# Patient Record
Sex: FEMALE | Race: BLACK OR AFRICAN AMERICAN | Employment: FULL TIME | URBAN - METROPOLITAN AREA
[De-identification: names, ages, dates, MRNs, and addresses within clinical notes are randomized per-mention and may not be internally consistent; named-entity substitution may affect disease eponyms.]

---

## 2017-03-24 RX ORDER — HYDROCHLOROTHIAZIDE 12.5 MG/1
CAPSULE ORAL
Qty: 30 CAP | Refills: 6 | Status: SHIPPED | OUTPATIENT
Start: 2017-03-24

## 2019-09-12 ENCOUNTER — OFFICE VISIT (OUTPATIENT)
Dept: ENDOCRINOLOGY | Age: 69
End: 2019-09-12

## 2019-09-12 VITALS
TEMPERATURE: 98.6 F | OXYGEN SATURATION: 97 % | HEART RATE: 97 BPM | DIASTOLIC BLOOD PRESSURE: 81 MMHG | WEIGHT: 143.8 LBS | RESPIRATION RATE: 18 BRPM | HEIGHT: 62 IN | SYSTOLIC BLOOD PRESSURE: 149 MMHG | BODY MASS INDEX: 26.46 KG/M2

## 2019-09-12 DIAGNOSIS — E83.52 HYPERCALCEMIA: ICD-10-CM

## 2019-09-12 DIAGNOSIS — E04.2 NONTOXIC MULTINODULAR GOITER: ICD-10-CM

## 2019-09-12 DIAGNOSIS — E67.3 HYPERVITAMINOSIS D: ICD-10-CM

## 2019-09-12 DIAGNOSIS — E83.52 HYPERCALCEMIA: Primary | ICD-10-CM

## 2019-09-12 DIAGNOSIS — M81.0 SENILE OSTEOPOROSIS: ICD-10-CM

## 2019-09-12 RX ORDER — METFORMIN HYDROCHLORIDE 1000 MG/1
TABLET ORAL
Refills: 5 | COMMUNITY
Start: 2019-07-29

## 2019-09-12 RX ORDER — ATENOLOL 50 MG/1
50 TABLET ORAL DAILY
Qty: 30 TAB | Refills: 6 | Status: SHIPPED | OUTPATIENT
Start: 2019-09-12 | End: 2020-03-19 | Stop reason: SDUPTHER

## 2019-09-12 RX ORDER — HYDROCHLOROTHIAZIDE 25 MG/1
TABLET ORAL
Refills: 1 | COMMUNITY
Start: 2019-06-07

## 2019-09-12 RX ORDER — IRBESARTAN 150 MG/1
150 TABLET ORAL 2 TIMES DAILY
Refills: 5 | COMMUNITY
Start: 2019-07-22

## 2019-09-12 RX ORDER — PIOGLITAZONEHYDROCHLORIDE 15 MG/1
TABLET ORAL
Refills: 5 | COMMUNITY
Start: 2019-07-17

## 2019-09-12 NOTE — PROGRESS NOTES
1. Have you been to the ER, urgent care clinic since your last visit? No Hospitalized since your last visit? No    2. Have you seen or consulted any other health care providers outside of the [address] [place] [address] since your last visit? Include any pap smears or colon screening.  No    Wt Readings from Last 3 Encounters:   09/12/19 143 lb 12.8 oz (65.2 kg)   10/19/15 132 lb (59.9 kg)   07/10/15 135 lb (61.2 kg)     Temp Readings from Last 3 Encounters:   09/12/19 98.6 °F (37 °C) (Oral)   10/19/15 97.1 °F (36.2 °C) (Oral)   07/10/15 98.1 °F (36.7 °C) (Oral)     BP Readings from Last 3 Encounters:   09/12/19 149/81   10/19/15 (!) 175/91   07/10/15 (!) 187/103     Pulse Readings from Last 3 Encounters:   09/12/19 97   10/19/15 92   07/10/15 91

## 2019-09-12 NOTE — PROGRESS NOTES
HISTORY OF PRESENT ILLNESS  Ekta Ken is a 68 y.o. female. HPI  Initial visit for hypercalcemia   Referred by pcp     Patient presents with hypercalcemia /hyperparathyroidism. Accompanied by her  Daughter     Pt was seen years ago for the same diagnosis   eval was complete     The patient has  asymptomatic elevation of the serum and calcium and parathormone. onset of symptoms was several weeks ago, stable since that time. She has  been taking medication : thiazide diuretic     She has not had previous history of head or neck radiation. She does not have familial history of endocrine malignancies. She has NOT BEEN taking OTC calcium supplements   History reviewed. No pertinent past medical history.     Social History     Socioeconomic History    Marital status: SINGLE     Spouse name: Not on file    Number of children: Not on file    Years of education: Not on file    Highest education level: Not on file   Occupational History    Not on file   Social Needs    Financial resource strain: Not on file    Food insecurity:     Worry: Not on file     Inability: Not on file    Transportation needs:     Medical: Not on file     Non-medical: Not on file   Tobacco Use    Smoking status: Former Smoker     Last attempt to quit: 2/3/2012     Years since quittin.6    Smokeless tobacco: Never Used   Substance and Sexual Activity    Alcohol use: No    Drug use: No    Sexual activity: Not on file   Lifestyle    Physical activity:     Days per week: Not on file     Minutes per session: Not on file    Stress: Not on file   Relationships    Social connections:     Talks on phone: Not on file     Gets together: Not on file     Attends Oriental orthodox service: Not on file     Active member of club or organization: Not on file     Attends meetings of clubs or organizations: Not on file     Relationship status: Not on file    Intimate partner violence:     Fear of current or ex partner: Not on file     Emotionally abused: Not on file     Physically abused: Not on file     Forced sexual activity: Not on file   Other Topics Concern    Not on file   Social History Narrative    Not on file       Family History   Problem Relation Age of Onset    Diabetes Sister     Cancer Brother     Diabetes Brother        Review of Systems   Constitutional: Negative. HENT: Negative. Eyes: Negative. Respiratory: Negative. Cardiovascular: Negative. Gastrointestinal: Negative. Genitourinary: Negative. Musculoskeletal: Negative. Skin: Negative. Neurological: Negative. Endo/Heme/Allergies: Negative. Psychiatric/Behavioral: Negative. Physical Exam   Constitutional: She is oriented to person, place, and time. She appears well-developed and well-nourished. HENT:   Head: Normocephalic. Eyes: Pupils are equal, round, and reactive to light. Conjunctivae and EOM are normal.   Neck: Normal range of motion. Neck supple. Cardiovascular: Normal rate and regular rhythm. Pulmonary/Chest: Effort normal and breath sounds normal.   Abdominal: Soft. Bowel sounds are normal.   Musculoskeletal: Normal range of motion. Neurological: She is alert and oriented to person, place, and time. She has normal reflexes. Skin: Skin is warm and dry. Psychiatric: She has a normal mood and affect. ASSESSMENT and PLAN    1.  Hypercalcemia :    Patient has mild range of hypercalcemia   Differential includes - medication induced- HCTZ   Versus primary hyperparathyroidism     Will stop HCTZ and as stopping hctz is going to affect BP, will add a BP medication norvasc     Will order repeat labs   Will order 24 hr urine for calcium, sodium , creatinine  and bone DEXA     OLD PARATHYROID SCAN  IN 2015  DID NOT LOCALIZE THE TUMOR AND  very low 24 hr urine calcium levels raising the differential diagnosis to include [address] [place] [address]     As  most patients do not require aggressive intervention unless calcium levels are high  I optED for watchful observation IN HER CASE   In some patients WHERE  parathyroid adenoma GETS LOCALIZED, AND  if they satisfy surgical criterion we recommend surgical intervention    Patients can continue on taking low dose calcium supplements despite the hypecalcemia         PATIENT  AND HER DAUGHTER  HAS BEEN EXPLAINED ABOUT THE PAST EVALUATION       > 50 % of time is spent on counseling   Patient voiced understanding her plan of care

## 2019-09-12 NOTE — LETTER
9/15/19 Patient: Iza Sahu YOB: 1950 Date of Visit: 9/12/2019 Joelle Walsh MD 
[address] [address] VIA Facsimile: [phone] Dear Joelle Walsh MD, Thank you for referring Ms. Iza Sahu to [address] [place] [address] for evaluation. My notes for this consultation are attached. If you have questions, please do not hesitate to call me. I look forward to following your patient along with you. Sincerely, Opal Howard MD

## 2019-09-12 NOTE — PATIENT INSTRUCTIONS
STOP  HCTZ FOR A MONTH  Start on  Atenolol 50 mg a day   IN ABSENCE OF  HCTZ        -------------------------------------------------------------------------------------------------------------------------------------    24 hour urine collection instructions , for calcium, creat and sodium   After  3 weeks of stopping  HCTZ    On the day you plan to collect urine    1. Discard first urine sample soon after you get up    2. Start collecting urine samples in the container then on whole first day . Crissa January ... 3. until the first sample next day is collected into the jar.

## 2019-10-02 DIAGNOSIS — E83.52 HYPERCALCEMIA: Primary | ICD-10-CM

## 2019-10-07 ENCOUNTER — HOSPITAL ENCOUNTER (OUTPATIENT)
Dept: LAB | Age: 69
Discharge: HOME | End: 2019-10-07
Payer: MEDICARE

## 2019-10-07 DIAGNOSIS — E83.52 HYPERCALCEMIA: ICD-10-CM

## 2019-10-07 PROCEDURE — 82570 ASSAY OF URINE CREATININE: CPT

## 2019-10-07 PROCEDURE — 84300 ASSAY OF URINE SODIUM: CPT

## 2019-10-07 PROCEDURE — 82340 ASSAY OF CALCIUM IN URINE: CPT

## 2019-10-08 LAB
CALCIUM 24H UR-MCNC: 1.7 MG/DL
CALCIUM 24H UR-MRATE: 22.1 MG/24 HR (ref 100–300)
CREAT 24H UR-MRATE: 894 MG/24 HR (ref 800–1800)
CREAT UR-MCNC: 68.8 MG/DL
SODIUM 24H UR-SCNC: 96 MMOL/L
SODIUM 24H UR-SRATE: 125 MMOL/24 HR (ref 39–258)

## 2019-10-15 ENCOUNTER — HOSPITAL ENCOUNTER (OUTPATIENT)
Dept: MAMMOGRAPHY | Age: 69
Discharge: HOME | End: 2019-10-15
Attending: INTERNAL MEDICINE
Payer: MEDICARE

## 2019-10-15 DIAGNOSIS — M81.0 SENILE OSTEOPOROSIS: ICD-10-CM

## 2019-10-15 DIAGNOSIS — E83.52 HYPERCALCEMIA: ICD-10-CM

## 2019-10-15 PROCEDURE — 77080 DXA BONE DENSITY AXIAL: CPT

## 2019-10-16 NOTE — PROGRESS NOTES
Inform pt and daughter that the urine calcium is very low going in favor of genetic problem   So, no further testing needed

## 2019-11-15 ENCOUNTER — TELEPHONE (OUTPATIENT)
Dept: ENDOCRINOLOGY | Age: 69
End: 2019-11-15

## 2019-11-15 NOTE — TELEPHONE ENCOUNTER
----- Message from Irena Kaiser MD sent at 11/15/2019  3:05 PM EST -----  Regarding: RE: appointment  These both tests are done better at [facility]     She had them once , and it was negative many years ago     If this time at [facility] is negative, I might plan to send her to spect at [facility] too         ----- Message -----  From: Christy Crandall: 11/11/2019   2:31 PM EST  To: Irena Kaiser MD  Subject: appointment                                      Patient called wanting to know if she still needs testing US and parathyroid scan. If so can she go somewhere around here?

## 2019-11-15 NOTE — TELEPHONE ENCOUNTER
Spoke with patient and she will schedule appt. Spoke with Scheduling Dept and they will call patient.

## 2019-12-02 ENCOUNTER — TELEPHONE (OUTPATIENT)
Dept: ENDOCRINOLOGY | Age: 69
End: 2019-12-02

## 2019-12-02 NOTE — TELEPHONE ENCOUNTER
Spoke to patient. Patient was inquiring if she needs to have ultrasound and parathyroid scan done on December 4, 2019. I informed patient that she needs to have these test done. Patient verbalized understanding.

## 2019-12-02 NOTE — TELEPHONE ENCOUNTER
----- Message from Divya Brandon sent at 11/29/2019  2:18 PM EST -----  Regarding: Dr. Marcelina Landin Message/Vendor Calls    Caller's first and last name:  Pt    Reason for call:  Discuss upcoming procedure appt scheduled at [facility]. Callback required yes/no and why:  YES    Best contact number(s):  [phone]    Details to clarify the request:  Pt requesting to speak with provider in regards to test scheduled at [facility].  Inquiring what test well be done at [facility]

## 2019-12-04 ENCOUNTER — HOSPITAL ENCOUNTER (OUTPATIENT)
Dept: NUCLEAR MEDICINE | Age: 69
Discharge: HOME | End: 2019-12-04
Attending: INTERNAL MEDICINE
Payer: MEDICARE

## 2019-12-04 ENCOUNTER — HOSPITAL ENCOUNTER (OUTPATIENT)
Dept: ULTRASOUND IMAGING | Age: 69
Discharge: HOME | End: 2019-12-04
Attending: INTERNAL MEDICINE
Payer: MEDICARE

## 2019-12-04 DIAGNOSIS — E83.52 HYPERCALCEMIA: ICD-10-CM

## 2019-12-04 PROCEDURE — 78070 PARATHYROID PLANAR IMAGING: CPT

## 2019-12-04 PROCEDURE — 76536 US EXAM OF HEAD AND NECK: CPT

## 2019-12-04 NOTE — PROGRESS NOTES
Inform the daughter that there is no parathyroid adenoma visualized.   There is a thyroid nodule on the right lobe which is gotten bigger in size  Patient will be following up with me in the month of January and at that time the plan will be discussed with the patient and her daughter    Jory Alarcon MD

## 2020-01-16 ENCOUNTER — LAB ONLY (OUTPATIENT)
Dept: ENDOCRINOLOGY | Age: 70
End: 2020-01-16

## 2020-01-16 ENCOUNTER — HOSPITAL ENCOUNTER (OUTPATIENT)
Dept: LAB | Age: 70
Discharge: HOME | End: 2020-01-16

## 2020-01-16 DIAGNOSIS — E83.52 HYPERCALCEMIA: Primary | ICD-10-CM

## 2020-01-16 DIAGNOSIS — E83.52 HYPERCALCEMIA: ICD-10-CM

## 2020-01-16 LAB
25(OH)D3 SERPL-MCNC: 40.8 NG/ML (ref 30–100)
ALBUMIN SERPL-MCNC: 4.2 G/DL (ref 3.5–5)
ALBUMIN/GLOB SERPL: 0.9 {RATIO} (ref 1.1–2.2)
ALP SERPL-CCNC: 54 U/L (ref 45–117)
ALT SERPL-CCNC: 16 U/L (ref 12–78)
ANION GAP SERPL CALC-SCNC: 9 MMOL/L (ref 5–15)
AST SERPL-CCNC: 14 U/L (ref 15–37)
BILIRUB SERPL-MCNC: 0.2 MG/DL (ref 0.2–1)
BUN SERPL-MCNC: 33 MG/DL (ref 6–20)
BUN/CREAT SERPL: 26 (ref 12–20)
CALCIUM SERPL-MCNC: 10.9 MG/DL (ref 8.5–10.1)
CHLORIDE SERPL-SCNC: 105 MMOL/L (ref 97–108)
CO2 SERPL-SCNC: 24 MMOL/L (ref 21–32)
CREAT SERPL-MCNC: 1.25 MG/DL (ref 0.55–1.02)
GLOBULIN SER CALC-MCNC: 4.9 G/DL (ref 2–4)
GLUCOSE SERPL-MCNC: 139 MG/DL (ref 65–100)
POTASSIUM SERPL-SCNC: 4.8 MMOL/L (ref 3.5–5.1)
PROT SERPL-MCNC: 9.1 G/DL (ref 6.4–8.2)
SODIUM SERPL-SCNC: 138 MMOL/L (ref 136–145)

## 2020-01-17 LAB
CALCIUM SERPL-MCNC: 10.8 MG/DL (ref 8.5–10.1)
PTH-INTACT SERPL-MCNC: 86.1 PG/ML (ref 18.4–88)

## 2020-01-23 ENCOUNTER — OFFICE VISIT (OUTPATIENT)
Dept: ENDOCRINOLOGY | Age: 70
End: 2020-01-23

## 2020-01-23 VITALS
OXYGEN SATURATION: 99 % | HEART RATE: 98 BPM | DIASTOLIC BLOOD PRESSURE: 88 MMHG | RESPIRATION RATE: 18 BRPM | HEIGHT: 62 IN | TEMPERATURE: 97.2 F | SYSTOLIC BLOOD PRESSURE: 162 MMHG | WEIGHT: 139 LBS | BODY MASS INDEX: 25.58 KG/M2

## 2020-01-23 DIAGNOSIS — E83.52 HYPERCALCEMIA: Primary | ICD-10-CM

## 2020-01-23 DIAGNOSIS — E04.1 THYROID NODULE: ICD-10-CM

## 2020-01-23 DIAGNOSIS — E88.09 HYPERPROTEINEMIA: ICD-10-CM

## 2020-01-23 NOTE — PROGRESS NOTES
Room 2    Identified pt with two pt identifiers(name and ). Reviewed record in preparation for visit and have obtained necessary documentation. All patient medications has been reviewed. Chief Complaint   Patient presents with    Elevated Blood Calcium     no concerns.  Thyroid Problem       Health Maintenance Due   Topic    Hepatitis C Screening     HEMOGLOBIN A1C Q6M     LIPID PANEL Q1     FOOT EXAM Q1     MICROALBUMIN Q1     DTaP/Tdap/Td series (1 - Tdap)    Shingrix Vaccine Age 50> (1 of 2)    BREAST CANCER SCRN MAMMOGRAM     FOBT Q 1 YEAR AGE 50-75     GLAUCOMA SCREENING Q2Y     Pneumococcal 65+ years (1 of 1 - PPSV23)    EYE EXAM RETINAL OR DILATED     MEDICARE YEARLY EXAM     Influenza Age 5 to Adult        Vitals:    20 1143 20 1152   BP: 173/90 162/88   Pulse: 98    Resp: 18    Temp: 97.2 °F (36.2 °C)    TempSrc: Oral    SpO2: 99%    Weight: 139 lb (63 kg)    Height: 5' 2\" (1.575 m)    PainSc:   0 - No pain        Wt Readings from Last 3 Encounters:   20 139 lb (63 kg)   19 143 lb 12.8 oz (65.2 kg)   10/19/15 132 lb (59.9 kg)     Temp Readings from Last 3 Encounters:   20 97.2 °F (36.2 °C) (Oral)   19 98.6 °F (37 °C) (Oral)   10/19/15 97.1 °F (36.2 °C) (Oral)     BP Readings from Last 3 Encounters:   20 162/88   19 149/81   10/19/15 (!) 175/91     Pulse Readings from Last 3 Encounters:   20 98   19 97   10/19/15 92       No results found for: HBA1C, HGBE8, ZMC4JPAZ, ZYK1FZTS, WFU3PQID    Coordination of Care Questionnaire:   1) Have you been to an emergency room, urgent care, or hospitalized since your last visit?   no       2. Have seen or consulted any other health care provider since your last visit? NO    3) Do you have an Advanced Directive/ Living Will in place?  NO  If yes, do we have a copy on file NO  If no, would you like information NO    Patient is accompanied by daughter I have received verbal consent from Trey Noe Calista to discuss any/all medical information while they are present in the room.

## 2020-01-23 NOTE — PROGRESS NOTES
HISTORY OF PRESENT ILLNESS  Nieves Krabbe is a 68 y.o. female. HPI  First follow up after  Initial visit for hypercalcemia   From  2019       Accompanied  By daughter   She had labs done as requested           Old history  :    Referred by pcp   Patient presents with hypercalcemia /hyperparathyroidism. Accompanied by her  Daughter   Pt was seen years ago for the same diagnosis   eval was complete     The patient has  asymptomatic elevation of the serum and calcium and parathormone. onset of symptoms was several weeks ago, stable since that time. She has  been taking medication : thiazide diuretic     She has not had previous history of head or neck radiation. She does not have familial history of endocrine malignancies. She has NOT BEEN taking OTC calcium supplements   History reviewed. No pertinent past medical history.     Social History     Socioeconomic History    Marital status: SINGLE     Spouse name: Not on file    Number of children: Not on file    Years of education: Not on file    Highest education level: Not on file   Occupational History    Not on file   Social Needs    Financial resource strain: Not on file    Food insecurity:     Worry: Not on file     Inability: Not on file    Transportation needs:     Medical: Not on file     Non-medical: Not on file   Tobacco Use    Smoking status: Former Smoker     Last attempt to quit: 2/3/2012     Years since quittin.9    Smokeless tobacco: Never Used   Substance and Sexual Activity    Alcohol use: No    Drug use: No    Sexual activity: Not on file   Lifestyle    Physical activity:     Days per week: Not on file     Minutes per session: Not on file    Stress: Not on file   Relationships    Social connections:     Talks on phone: Not on file     Gets together: Not on file     Attends Mormonism service: Not on file     Active member of club or organization: Not on file     Attends meetings of clubs or organizations: Not on file Relationship status: Not on file    Intimate partner violence:     Fear of current or ex partner: Not on file     Emotionally abused: Not on file     Physically abused: Not on file     Forced sexual activity: Not on file   Other Topics Concern    Not on file   Social History Narrative    Not on file       Family History   Problem Relation Age of Onset    Diabetes Sister     Cancer Brother     Diabetes Brother        Review of Systems   Constitutional: Negative. HENT: Negative. Eyes: Negative. Respiratory: Negative. Cardiovascular: Negative. Gastrointestinal: Negative. Genitourinary: Negative. Musculoskeletal: Negative. Skin: Negative. Neurological: Negative. Endo/Heme/Allergies: Negative. Psychiatric/Behavioral: Negative. Physical Exam   Constitutional: She is oriented to person, place, and time. She appears well-developed and well-nourished. HENT:   Head: Normocephalic. Eyes: Pupils are equal, round, and reactive to light. Conjunctivae and EOM are normal.   Neck: Normal range of motion. Neck supple. Cardiovascular: Normal rate and regular rhythm. Pulmonary/Chest: Effort normal and breath sounds normal.   Abdominal: Soft. Bowel sounds are normal.   Musculoskeletal: Normal range of motion. Neurological: She is alert and oriented to person, place, and time. She has normal reflexes. Skin: Skin is warm and dry. Psychiatric: She has a normal mood and affect. ASSESSMENT and PLAN    1.  Hypercalcemia :  Patient has mild range of hypercalcemia   Differential includes - medication induced- HCTZ   Versus primary hyperparathyroidism     Repeated  24 hr urine for calcium, sodium , creatinine  and the levels are still very low '    Reviewed DEXA results and compared to 2015 - bone quality is stable   Date : sept 2019   Bone DEXA  ap spine T-score 1.2; left femoral Neck T- score -1.3 , right femoral neck T-score -1.2    Moreover, her calcium is high normal from high albumin is a possibility       OLD PARATHYROID SCAN  IN 2015  DID NOT LOCALIZE THE TUMOR AND noted  very low 24 hr urine calcium levels raising the differential diagnosis to include [address]       2. Hyperproteinemia  :   Will refer to heam-onc again  Pt was referred once in July 2015 but not sure she followed up then for abnormal Immunofixation test       3.  Thyroid nodule on right side - FNA scheduled   Lab given     > 50 % of time is spent on counseling   Patient voiced understanding her plan of care

## 2020-01-23 NOTE — LETTER
1/23/20 Patient: Rosalino Ibarra YOB: 1950 Date of Visit: 1/23/2020 Evens Tillman MD 
[address] [facility] [address] VIA Facsimile: [phone] Dear Evens Tillman MD, Thank you for referring Ms. Rosalino Ibarra to [id number] [place] [address] for evaluation. My notes for this consultation are attached. If you have questions, please do not hesitate to call me. I look forward to following your patient along with you. Sincerely, Armando Aaron MD

## 2020-01-24 ENCOUNTER — TELEPHONE (OUTPATIENT)
Dept: ENDOCRINOLOGY | Age: 70
End: 2020-01-24

## 2020-01-24 NOTE — TELEPHONE ENCOUNTER
No no, not connected   This abnormal level was seen in 2015 also, we told her to see a hematologist then also and do not know what happened then

## 2020-01-24 NOTE — TELEPHONE ENCOUNTER
Pt states when she was seen for a visit, she had elevated protein levels. She states she has been drinking a lot of \"protein water\" and she wonders if this could have something to do with it.   Please advise

## 2020-01-30 NOTE — TELEPHONE ENCOUNTER
Informed pt.  She states someone reached out to her in regards to hematology referral, and she is in the process of getting things scheduled

## 2020-03-19 DIAGNOSIS — E83.52 HYPERCALCEMIA: ICD-10-CM

## 2020-03-19 DIAGNOSIS — E67.3 HYPERVITAMINOSIS D: ICD-10-CM

## 2020-03-19 DIAGNOSIS — E04.2 NONTOXIC MULTINODULAR GOITER: ICD-10-CM

## 2020-03-19 RX ORDER — ATENOLOL 50 MG/1
50 TABLET ORAL DAILY
Qty: 90 TAB | Refills: 4 | Status: SHIPPED | OUTPATIENT
Start: 2020-03-19

## 2023-01-12 ENCOUNTER — DOCUMENTATION ONLY (OUTPATIENT)
Dept: ENDOCRINOLOGY | Age: 73
End: 2023-01-12

## 2023-01-12 NOTE — PROGRESS NOTES
Patient saw Dr. Gallego Levels for hyperprteinemia.  Noted no concern   High protein is elevating calcium falsely    Sophia Banks MD

## 2023-05-26 RX ORDER — HYDROCHLOROTHIAZIDE 12.5 MG/1
1 CAPSULE, GELATIN COATED ORAL DAILY
COMMUNITY
Start: 2017-03-24

## 2023-05-26 RX ORDER — IRBESARTAN 150 MG/1
150 TABLET ORAL 2 TIMES DAILY
COMMUNITY
Start: 2019-07-22

## 2023-05-26 RX ORDER — ATENOLOL 50 MG/1
50 TABLET ORAL DAILY
COMMUNITY
Start: 2020-03-19

## 2023-05-26 RX ORDER — HYDROCHLOROTHIAZIDE 25 MG/1
1 TABLET ORAL DAILY
COMMUNITY
Start: 2019-06-07

## 2023-05-26 RX ORDER — AMLODIPINE BESYLATE 10 MG/1
TABLET ORAL DAILY
COMMUNITY

## 2023-05-26 RX ORDER — PIOGLITAZONEHYDROCHLORIDE 15 MG/1
1 TABLET ORAL DAILY
COMMUNITY
Start: 2019-07-17

## 2023-05-26 RX ORDER — CLOPIDOGREL BISULFATE 75 MG/1
TABLET ORAL DAILY
COMMUNITY

## 2023-05-26 RX ORDER — SIMVASTATIN 10 MG
TABLET ORAL
COMMUNITY